# Patient Record
Sex: FEMALE | Race: WHITE | NOT HISPANIC OR LATINO | Employment: OTHER | URBAN - METROPOLITAN AREA
[De-identification: names, ages, dates, MRNs, and addresses within clinical notes are randomized per-mention and may not be internally consistent; named-entity substitution may affect disease eponyms.]

---

## 2017-08-05 ENCOUNTER — CHARTING TRANS (OUTPATIENT)
Dept: URGENT CARE | Age: 62
End: 2017-08-05

## 2017-08-11 ENCOUNTER — CHARTING TRANS (OUTPATIENT)
Dept: URGENT CARE | Age: 62
End: 2017-08-11

## 2017-08-11 ASSESSMENT — PAIN SCALES - GENERAL: PAINLEVEL_OUTOF10: 0

## 2018-11-28 VITALS
HEART RATE: 68 BPM | TEMPERATURE: 97.6 F | RESPIRATION RATE: 16 BRPM | DIASTOLIC BLOOD PRESSURE: 68 MMHG | OXYGEN SATURATION: 99 % | SYSTOLIC BLOOD PRESSURE: 126 MMHG

## 2018-11-28 VITALS
HEART RATE: 71 BPM | RESPIRATION RATE: 16 BRPM | DIASTOLIC BLOOD PRESSURE: 80 MMHG | TEMPERATURE: 97.2 F | SYSTOLIC BLOOD PRESSURE: 118 MMHG | OXYGEN SATURATION: 99 %

## 2018-12-21 ENCOUNTER — ANESTHESIA (OUTPATIENT)
Dept: ENDOSCOPY | Facility: HOSPITAL | Age: 63
End: 2018-12-21
Payer: COMMERCIAL

## 2018-12-21 ENCOUNTER — ANESTHESIA EVENT (OUTPATIENT)
Dept: ENDOSCOPY | Facility: HOSPITAL | Age: 63
End: 2018-12-21
Payer: COMMERCIAL

## 2018-12-21 ENCOUNTER — HOSPITAL ENCOUNTER (OUTPATIENT)
Facility: HOSPITAL | Age: 63
Setting detail: HOSPITAL OUTPATIENT SURGERY
Discharge: HOME | End: 2018-12-21
Attending: INTERNAL MEDICINE | Admitting: INTERNAL MEDICINE
Payer: COMMERCIAL

## 2018-12-21 VITALS
RESPIRATION RATE: 18 BRPM | HEIGHT: 69.5 IN | DIASTOLIC BLOOD PRESSURE: 73 MMHG | SYSTOLIC BLOOD PRESSURE: 116 MMHG | HEART RATE: 59 BPM | OXYGEN SATURATION: 100 % | BODY MASS INDEX: 21.72 KG/M2 | WEIGHT: 150 LBS

## 2018-12-21 PROCEDURE — 0DBK8ZX EXCISION OF ASCENDING COLON, VIA NATURAL OR ARTIFICIAL OPENING ENDOSCOPIC, DIAGNOSTIC: ICD-10-PCS | Performed by: INTERNAL MEDICINE

## 2018-12-21 PROCEDURE — 88305 TISSUE EXAM BY PATHOLOGIST: CPT | Performed by: INTERNAL MEDICINE

## 2018-12-21 RX ORDER — SODIUM CHLORIDE, SODIUM LACTATE, POTASSIUM CHLORIDE, CALCIUM CHLORIDE 600; 310; 30; 20 MG/100ML; MG/100ML; MG/100ML; MG/100ML
INJECTION, SOLUTION INTRAVENOUS CONTINUOUS
Status: DISCONTINUED | OUTPATIENT
Start: 2018-12-21 | End: 2018-12-21

## 2018-12-21 RX ORDER — NALOXONE HYDROCHLORIDE 0.4 MG/ML
80 INJECTION, SOLUTION INTRAMUSCULAR; INTRAVENOUS; SUBCUTANEOUS AS NEEDED
Status: DISCONTINUED | OUTPATIENT
Start: 2018-12-21 | End: 2018-12-21

## 2018-12-21 NOTE — OPERATIVE REPORT
[facility]    PATIENT'S NAME: Monik Foster   ATTENDING PHYSICIAN: Dixon Roblero M.D. OPERATING PHYSICIAN: Dixon Roblero M.D.    PATIENT ACCOUNT#:   [de-identified]    LOCATION:  ENDO  ENDO POOL ROOMS 3 EDWP  MEDICAL RECORD #:   [id number]       DATE OF 10:37:10  Job [id number]  TR/    cc: Farshad Lozano M.D.

## 2018-12-21 NOTE — ANESTHESIA POSTPROCEDURE EVALUATION
[address] [facility] Patient Status:  Hospital Outpatient Surgery   Age/Gender 59year old female MRN [id number]   Location [address] [place] [address]. Attending Louie Lala MD   Hosp Day # 0 PCP Sidney Ray MD       Anesthesia Post-op Note

## 2018-12-21 NOTE — OPERATIVE REPORT
Colonoscopy/excisional biopsy. Dx. Diminutive ascending colon polyp. @. Diverticulosis. #. Rectal avms. $.hemorrhoids. internal and external grade 1.dictated full report.

## 2018-12-21 NOTE — ANESTHESIA PREPROCEDURE EVALUATION
PRE-OP EVALUATION    Patient Name: Yamil Drake    Pre-op Diagnosis: HX OF COLON POLYPS    Procedure(s):  DIAGNOSTIC COLONOSCOPY    Surgeon(s) and Role:     Bhavin Miguel MD - Primary    Pre-op vitals reviewed.   Pulse: 64  Resp: 18  BP: 141/69  SpO2: 6/19/2014    Performed by Radha Collier MD at [address] [place] [address]   • TRANSFORAMINAL LUMBAR EPIDURAL STEROID INJECTION SINGLE LEVEL Left 6/12/2014    Performed by Radha Collier MD at [facility] MAIN OR   • UPPER GI ENDOSCOPY,EXAM       Social History    Tobacco Use

## 2019-02-11 ENCOUNTER — HOSPITAL ENCOUNTER (OUTPATIENT)
Dept: BONE DENSITY | Age: 64
Discharge: HOME | End: 2019-02-11
Attending: FAMILY MEDICINE
Payer: COMMERCIAL

## 2019-02-11 DIAGNOSIS — M81.0 OSTEOPOROSIS: ICD-10-CM

## 2019-02-11 PROCEDURE — 77080 DXA BONE DENSITY AXIAL: CPT | Performed by: FAMILY MEDICINE

## 2019-05-28 ENCOUNTER — HOSPITAL ENCOUNTER (OUTPATIENT)
Dept: GENERAL RADIOLOGY | Facility: HOSPITAL | Age: 64
Discharge: HOME | End: 2019-05-28
Attending: FAMILY MEDICINE
Payer: COMMERCIAL

## 2019-05-28 DIAGNOSIS — S99.929A TOE INJURY: ICD-10-CM

## 2019-05-28 PROCEDURE — 73630 X-RAY EXAM OF FOOT: CPT | Performed by: FAMILY MEDICINE

## 2019-12-17 ENCOUNTER — HOSPITAL ENCOUNTER (OUTPATIENT)
Dept: GENERAL RADIOLOGY | Facility: HOSPITAL | Age: 64
Discharge: HOME | End: 2019-12-17
Attending: FAMILY MEDICINE
Payer: COMMERCIAL
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
## 2019-12-17 PROCEDURE — 73560 X-RAY EXAM OF KNEE 1 OR 2: CPT | Performed by: FAMILY MEDICINE

## 2021-07-18 ENCOUNTER — IMAGING SERVICES (OUTPATIENT)
Dept: GENERAL RADIOLOGY | Age: 66
End: 2021-07-18
Attending: FAMILY MEDICINE

## 2021-07-18 ENCOUNTER — WALK IN (OUTPATIENT)
Dept: URGENT CARE | Age: 66
End: 2021-07-18

## 2021-07-18 VITALS
SYSTOLIC BLOOD PRESSURE: 112 MMHG | TEMPERATURE: 98.2 F | RESPIRATION RATE: 16 BRPM | OXYGEN SATURATION: 98 % | HEART RATE: 72 BPM | DIASTOLIC BLOOD PRESSURE: 70 MMHG

## 2021-07-18 DIAGNOSIS — S99.921A INJURY OF RIGHT FOOT, INITIAL ENCOUNTER: ICD-10-CM

## 2021-07-18 DIAGNOSIS — S92.901A CLOSED FRACTURE OF RIGHT FOOT, INITIAL ENCOUNTER: ICD-10-CM

## 2021-07-18 DIAGNOSIS — S99.921A INJURY OF RIGHT FOOT, INITIAL ENCOUNTER: Primary | ICD-10-CM

## 2021-07-18 PROCEDURE — 73630 X-RAY EXAM OF FOOT: CPT | Performed by: RADIOLOGY

## 2021-07-18 PROCEDURE — 73610 X-RAY EXAM OF ANKLE: CPT | Performed by: RADIOLOGY

## 2021-07-18 PROCEDURE — E0114 CRUTCH UNDERARM PAIR NO WOOD: HCPCS | Performed by: FAMILY MEDICINE

## 2021-07-18 PROCEDURE — 99203 OFFICE O/P NEW LOW 30 MIN: CPT | Performed by: FAMILY MEDICINE

## 2021-07-18 RX ORDER — ASPIRIN 81 MG/1
TABLET ORAL
COMMUNITY
Start: 2020-07-14

## 2021-07-18 RX ORDER — ROSUVASTATIN CALCIUM 10 MG/1
TABLET, COATED ORAL
COMMUNITY
Start: 2020-09-14

## 2021-07-18 RX ORDER — MAGNESIUM OXIDE 400 MG/1
TABLET ORAL
COMMUNITY

## 2021-07-18 RX ORDER — LORATADINE 10 MG/1
CAPSULE, LIQUID FILLED ORAL
COMMUNITY

## 2021-07-18 RX ORDER — OMEPRAZOLE 40 MG/1
40 CAPSULE, DELAYED RELEASE ORAL DAILY
COMMUNITY
Start: 2021-06-09

## 2021-07-18 ASSESSMENT — PAIN SCALES - GENERAL: PAINLEVEL: 8

## 2021-07-19 ENCOUNTER — OFFICE VISIT (OUTPATIENT)
Dept: SPORTS MEDICINE | Age: 66
End: 2021-07-19
Attending: FAMILY MEDICINE

## 2021-07-19 VITALS
HEIGHT: 69 IN | DIASTOLIC BLOOD PRESSURE: 72 MMHG | BODY MASS INDEX: 22.51 KG/M2 | WEIGHT: 152 LBS | SYSTOLIC BLOOD PRESSURE: 112 MMHG | HEART RATE: 78 BPM

## 2021-07-19 DIAGNOSIS — M79.671 RIGHT FOOT PAIN: Primary | ICD-10-CM

## 2021-07-19 DIAGNOSIS — M54.16 LUMBAR RADICULOPATHY: ICD-10-CM

## 2021-07-19 DIAGNOSIS — Z86.73 STATUS POST CEREBROVASCULAR ACCIDENT: ICD-10-CM

## 2021-07-19 DIAGNOSIS — S93.491A SPRAIN OF ANTERIOR TALOFIBULAR LIGAMENT OF RIGHT ANKLE, INITIAL ENCOUNTER: ICD-10-CM

## 2021-07-19 DIAGNOSIS — M25.571 ACUTE RIGHT ANKLE PAIN: ICD-10-CM

## 2021-07-19 PROCEDURE — L4387 NON-PNEUM WALK BOOT PRE OTS: HCPCS

## 2021-07-19 PROCEDURE — 99204 OFFICE O/P NEW MOD 45 MIN: CPT | Performed by: FAMILY MEDICINE

## 2021-07-19 RX ORDER — NAPROXEN 500 MG/1
500 TABLET ORAL 2 TIMES DAILY WITH MEALS
Qty: 20 TABLET | Refills: 0 | Status: SHIPPED | OUTPATIENT
Start: 2021-07-19 | End: 2021-07-29

## 2021-08-02 ENCOUNTER — TELEPHONE (OUTPATIENT)
Dept: SPORTS MEDICINE | Age: 66
End: 2021-08-02

## 2021-08-10 ENCOUNTER — OFFICE VISIT (OUTPATIENT)
Dept: SPORTS MEDICINE | Age: 66
End: 2021-08-10

## 2021-08-10 ENCOUNTER — IMAGING SERVICES (OUTPATIENT)
Dept: GENERAL RADIOLOGY | Age: 66
End: 2021-08-10
Attending: FAMILY MEDICINE

## 2021-08-10 VITALS
DIASTOLIC BLOOD PRESSURE: 70 MMHG | SYSTOLIC BLOOD PRESSURE: 116 MMHG | WEIGHT: 153.2 LBS | HEIGHT: 70 IN | BODY MASS INDEX: 21.93 KG/M2

## 2021-08-10 DIAGNOSIS — S93.491A SPRAIN OF ANTERIOR TALOFIBULAR LIGAMENT OF RIGHT ANKLE, INITIAL ENCOUNTER: ICD-10-CM

## 2021-08-10 DIAGNOSIS — S93.491D SPRAIN OF ANTERIOR TALOFIBULAR LIGAMENT OF RIGHT ANKLE, SUBSEQUENT ENCOUNTER: Primary | ICD-10-CM

## 2021-08-10 PROCEDURE — L1902 AFO ANKLE GAUNTLET PRE OTS: HCPCS

## 2021-08-10 PROCEDURE — 99214 OFFICE O/P EST MOD 30 MIN: CPT | Performed by: FAMILY MEDICINE

## 2021-08-10 PROCEDURE — 73630 X-RAY EXAM OF FOOT: CPT | Performed by: FAMILY MEDICINE

## 2021-08-11 ENCOUNTER — EXTERNAL RECORD (OUTPATIENT)
Dept: HEALTH INFORMATION MANAGEMENT | Facility: OTHER | Age: 66
End: 2021-08-11

## 2021-09-08 ENCOUNTER — EXTERNAL RECORD (OUTPATIENT)
Dept: HEALTH INFORMATION MANAGEMENT | Facility: OTHER | Age: 66
End: 2021-09-08

## 2021-09-21 ENCOUNTER — OFFICE VISIT (OUTPATIENT)
Dept: SPORTS MEDICINE | Age: 66
End: 2021-09-21

## 2021-09-21 VITALS
BODY MASS INDEX: 21.9 KG/M2 | WEIGHT: 153 LBS | HEART RATE: 72 BPM | HEIGHT: 70 IN | SYSTOLIC BLOOD PRESSURE: 122 MMHG | DIASTOLIC BLOOD PRESSURE: 84 MMHG

## 2021-09-21 DIAGNOSIS — S93.491D SPRAIN OF ANTERIOR TALOFIBULAR LIGAMENT OF RIGHT ANKLE, SUBSEQUENT ENCOUNTER: Primary | ICD-10-CM

## 2021-09-21 PROCEDURE — 99214 OFFICE O/P EST MOD 30 MIN: CPT | Performed by: FAMILY MEDICINE

## 2025-07-08 ENCOUNTER — WALK IN (OUTPATIENT)
Dept: URGENT CARE | Age: 70
End: 2025-07-08

## 2025-07-08 VITALS
RESPIRATION RATE: 16 BRPM | OXYGEN SATURATION: 98 % | HEART RATE: 98 BPM | SYSTOLIC BLOOD PRESSURE: 160 MMHG | DIASTOLIC BLOOD PRESSURE: 89 MMHG | TEMPERATURE: 98.1 F

## 2025-07-08 DIAGNOSIS — J02.9 SORE THROAT: Primary | ICD-10-CM

## 2025-07-08 DIAGNOSIS — Z20.822 CONTACT WITH AND (SUSPECTED) EXPOSURE TO COVID-19: ICD-10-CM

## 2025-07-08 LAB
FLUAV AG UPPER RESP QL IA.RAPID: NEGATIVE
FLUBV AG UPPER RESP QL IA.RAPID: NEGATIVE
INTERNAL PROCEDURAL CONTROLS ACCEPTABLE: YES
S PYO AG THROAT QL IA.RAPID: NEGATIVE
SARS-COV+SARS-COV-2 AG RESP QL IA.RAPID: NOT DETECTED
TEST LOT EXPIRATION DATE: NORMAL
TEST LOT EXPIRATION DATE: NORMAL
TEST LOT NUMBER: NORMAL
TEST LOT NUMBER: NORMAL

## 2025-07-08 RX ORDER — AZITHROMYCIN 250 MG/1
TABLET, FILM COATED ORAL
Qty: 6 TABLET | Refills: 0 | Status: SHIPPED | OUTPATIENT
Start: 2025-07-08 | End: 2025-07-13

## (undated) DEVICE — Device: Brand: DEFENDO AIR/WATER/SUCTION AND BIOPSY VALVE

## (undated) NOTE — LETTER
Ani Jarrell [address]  Joni, [address] [place] [address]    Consent for Operation  Date: __________________                                Time: _______________    1.  I authorize the performance upon Bree Cleaning the following operation:  Procedur procedure has been videotaped, the surgeon will obtain the original videotape. The hospital will not be responsible for storage or maintenance of this tape.   7. For the purpose of advancing medical education, I consent to the admittance of observers to the STATEMENTS REQUIRING INSERTION OR COMPLETION WERE FILLED IN.     Signature of Patient:   ___________________________    When the patient is a minor or mentally incompetent to give consent:  Signature of person authorized to consent for patient: ____________ supplements, and pills I can buy without a prescription (including street drugs/illegal medications). Failure to inform my anesthesiologist about these medicines may increase my risk of anesthetic complications. iv.  If I am allergic to anything or have ha Anesthesiologist Signature     Date   Time  I have discussed the procedure and information above with the patient (or patient’s representative) and answered their questions. The patient or their representative has agreed to have anesthesia services.     ___